# Patient Record
Sex: MALE | Race: WHITE
[De-identification: names, ages, dates, MRNs, and addresses within clinical notes are randomized per-mention and may not be internally consistent; named-entity substitution may affect disease eponyms.]

---

## 2018-04-07 ENCOUNTER — HOSPITAL ENCOUNTER (EMERGENCY)
Dept: HOSPITAL 17 - NEPJ | Age: 38
Discharge: HOME | End: 2018-04-07
Payer: COMMERCIAL

## 2018-04-07 VITALS
DIASTOLIC BLOOD PRESSURE: 73 MMHG | RESPIRATION RATE: 16 BRPM | TEMPERATURE: 97.9 F | SYSTOLIC BLOOD PRESSURE: 135 MMHG | OXYGEN SATURATION: 98 % | HEART RATE: 97 BPM

## 2018-04-07 VITALS — BODY MASS INDEX: 45.06 KG/M2 | WEIGHT: 304.24 LBS | HEIGHT: 69 IN

## 2018-04-07 VITALS
SYSTOLIC BLOOD PRESSURE: 147 MMHG | OXYGEN SATURATION: 97 % | HEART RATE: 103 BPM | DIASTOLIC BLOOD PRESSURE: 85 MMHG | TEMPERATURE: 98.6 F

## 2018-04-07 DIAGNOSIS — F43.23: Primary | ICD-10-CM

## 2018-04-07 DIAGNOSIS — F31.9: ICD-10-CM

## 2018-04-07 DIAGNOSIS — Z21: ICD-10-CM

## 2018-04-07 DIAGNOSIS — M19.072: ICD-10-CM

## 2018-04-07 DIAGNOSIS — F43.10: ICD-10-CM

## 2018-04-07 PROCEDURE — 99284 EMERGENCY DEPT VISIT MOD MDM: CPT

## 2018-04-07 NOTE — PD
Physical Exam


Time Seen by Provider:  17:23


Narrative


NASREEN Cutler has evaluated the patient, lifted the Baker act and cleared the 

patient for discharge.





Data


Data


Last Documented VS





Vital Signs








  Date Time  Temp Pulse Resp B/P (MAP) Pulse Ox O2 Delivery O2 Flow Rate FiO2


 


4/7/18 15:56 97.9 97 16 135/73 (93) 98 Room Air  








Orders





 Orders


Diet Regular Basic (4/7/18 Lunch)


Psych Screen (4/7/18 11:48)


Diet Regular Basic (4/7/18 Dinner)








MDM


Supervised Visit with KRISSY:  No


Narrative Course


NASREEN Cutler has evaluated the patient, lifted the Baker act and cleared the 

patient for discharge.  Patient contracts safety.  Denies suicidal or homicidal 

ideations.  Patient will be provided community resource packet to /[facility] for 

follow-up.  Has friends and family for support.  Patient was medically cleared 

by alternate provider prior to psych screening.  Patient has been evaluated by 

psychiatry and and is now cleared for discharge.


Diagnosis





 Primary Impression:  


 Adjustment disorder


 Qualified Codes:  F43.20 - Adjustment disorder, unspecified


Referrals:  


[facility] (Out patient)





[facility]





Primary Care Physician





Psychiatrist





[facility]


Patient Instructions:  General Instructions, Mood Disorders (ED)





***Additional Instruction:  


Contract safety to your self and others


Follow-up with psychiatry


Follow-up with primary care provider


Follow-up with Dioni Wheeler


Return to the emergency department immediately with worsening of symptoms


***Med/Other Pt SpecificInfo:  No Change to Meds, No Meds Exist/No RX given


Disposition:  01 DISCHARGE HOME


Condition:  Stable











Fallon Wren Apr 7, 2018 17:24
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
